# Patient Record
(demographics unavailable — no encounter records)

---

## 2025-07-09 NOTE — HISTORY OF PRESENT ILLNESS
[FreeTextEntry1] : CPE [de-identified] : 30-year-old female with no significant PMHx presenting to the clinic to Establish care and for an Annual Physical Exam. Patient states she felt extremely dizzy on  when she was outside celebrating . Patient states she felt nauseated and felt off, diaphoretic. Patient felt like she was going to pass out, describes as lightheadedness with associated palpitations. She then crouched down and felt better after about 30 min. She was then able to walk without an issue. Denies any LOC. Patient states she had 2 alcoholic drinks (rum and tequila) about an hour before experiencing dizziness. Patient states her HR did go up to 120s at the time. Since HR has not been out of the usual with workouts. She has never had anything like this happen before. Patient states since she has been feeling a little lightheaded after working out (stairmaster, treadmill). Patient states she has feels she did not drink enough water  weekend. Denies any new supplements, meds. Patient states she has been trying to lose weight recently and has modified her diet, cutting back on red meat, trying to eat healthier meals.  Patient states her BP tends to be elevated in the doctor's office; this AM BP was 110/80 at home. She has a BP cuff to monitor mom's BP.   No other complaints at this time.   FHx: mother (living, 55 yo) - T2DM, father (living, 63yo ) - healthy, maternal grandfather ( at 85 yo) - CAD, triple bypass, two sisters (living, 31yo and 26 yo) - both healthy    SOCIAL Hx: Lives with parents, feels safe at home Diet: breakfast - eggs, lunch - yogurt, dinner - veggie laquita, vegetables, occasional red meat  Exercise: stairmaster/treadmill walk on incline 4x days ago; no structured resistance training Tobacco: never a smoker Alcohol: socially, once a month; normally 1-2 drinks in one sitting Recreational Drugs: denies  Caffeine: drinks venti cold brew 5x days a week  Water intake: 8 glasses a day  Sexually active: sexually active with one male only; denies Hx of STIs  Occupation: works for [employer], purchasing products, desk work  Stressors: no significant stressors    HEALTHCARE MAINTENANCE: Last CPE: 2 years ago  Pap Smear: 2024, wnl per patient  LMP: 2025   IMMUNIZATIONS: Tetanus: declines

## 2025-07-09 NOTE — HEALTH RISK ASSESSMENT
[Yes] : Yes [0] : 2) Feeling down, depressed, or hopeless: Not at all (0) [PHQ-2 Negative - No further assessment needed] : PHQ-2 Negative - No further assessment needed [Fully functional (bathing, dressing, toileting, transferring, walking, feeding)] : Fully functional (bathing, dressing, toileting, transferring, walking, feeding) [Fully functional (using the telephone, shopping, preparing meals, housekeeping, doing laundry, using] : Fully functional and needs no help or supervision to perform IADLs (using the telephone, shopping, preparing meals, housekeeping, doing laundry, using transportation, managing medications and managing finances) [Monthly or less (1 pt)] : Monthly or less (1 point) [1 or 2 (0 pts)] : 1 or 2 (0 points) [Never (0 pts)] : Never (0 points) [No] : In the past 12 months have you used drugs other than those required for medical reasons? No [Patient reported PAP Smear was normal] : Patient reported PAP Smear was normal [With Family] : lives with family [Employed] : employed [Sexually Active] : sexually active [Feels Safe at Home] : Feels safe at home [Reports normal functional visual acuity (ie: able to read med bottle)] : Reports normal functional visual acuity [Never] : Never [0-4] : 0-4 [Audit-CScore] : 1 [de-identified] : see HPI [de-identified] : see HPI [[id number]] : 0 [High Risk Behavior] : no high risk behavior [Reports changes in hearing] : Reports no changes in hearing [Reports changes in vision] : Reports no changes in vision [Reports changes in dental health] : Reports no changes in dental health [PapSmearDate] : 07/24

## 2025-07-09 NOTE — ASSESSMENT
[Vaccines Reviewed] : Immunizations reviewed today. Please see immunization details in the vaccine log within the immunization flowsheet.  [FreeTextEntry1] : 30-year-old female with no significant PMHx presenting to the clinic to Establish care and for an Annual Physical Exam.  #Healthcare maintenance: - Routine Labs: CBC, CMP, TSH w/ reflex T4, Lipid panel, A1c - Unsure when she last got Tdap, declines Tdap at this time - Diet: Encouraged a balanced diet with portion control, emphasizing whole foods, lean proteins, fruits, and vegetables while minimizing processed foods. - Encouraged patient continue with regular cardio exercise for at least 150 minutes a week; recommend trying to incorporate strength training exercises at least twice weekly - Up to date on healthcare maintenance  #Dizziness - Likely secondary to dehydration, patient trying to lose weight recently but would rule out cardiac etiology - Associated with palpitations at time patient experienced dizziness on 7/4  - EKG today with SR, vent rate 80 bpm, no ischemic changes noted - Recommend seeing Cardiology - referral provided - Follow up in 3 months   #Elevated BP - /94 (LUE) - Repeat /86 (LUE) and 134/86 (RUE) - Recommend keeping BP log, taking BP twice a week - return to office if BP > 130/90 - Precautions provided for ED if patient symptomatic or BP significantly elevated  - Follow up as needed

## 2025-07-09 NOTE — PHYSICAL EXAM
[TextEntry] : GENERAL: patient appears well-developed, well-groomed, well-nourished; no acute distress; appropriately interactive HEAD: normocephalic, atraumatic EYES: sclera clear and anicteric, no exudates, PERRLA, EOMI ENMT: oropharynx clear without erythema, no exudates, moist mucous membranes; tympanic membrane non-bulging, non-erythematous, canal clear NECK: supple, soft, no lymphadenopathy, thyroid non-enlarged   LUNGS: good air entry bilaterally, clear to auscultation, symmetric breath sounds, no wheezing, crackles, or rhonchi appreciated HEART: S1/S2 present, regular rate and rhythm, no murmurs noted, no lower extremity edema, 2+ distal pulses in upper and lower extremities bilaterally GASTROINTESTINAL: abdomen is soft, non-tender, non-distended, normoactive bowel sounds INTEGUMENT: good skin turgor, warm skin, appears well perfused, no lesions, no rashes MUSCULOSKELETAL: no clubbing or cyanosis, no obvious deformity NEUROLOGIC: awake, alert, oriented x3, good muscle tone in all 4 extremities, CN II-XII intact, 5/5 motor strength in upper and lower extremities bilaterally, 5/5  strength, sensation intact in upper and lower extremities bilaterally PSYCHIATRIC: affect appropriate, mood congruent, thought process logical and linear HEME/LYMPH: no obvious ecchymosis or petechiae

## 2025-07-09 NOTE — REVIEW OF SYSTEMS
[TextEntry] : CONSTITUTIONAL: denies fever, chills, fatigue, weakness, significant weight loss/gain HEENT: denies blurred vision, vision changes, hearing loss, ear pain, sore throat CARDIOVASCULAR: denies chest pain, chest pressure; + palpitations RESPIRATORY: denies shortness of breath, cough, sputum production GASTROINTESTINAL: denies nausea, vomiting, diarrhea, constipation, abdominal pain, melena or hematochezia GENITOURINARY: denies dysuria, hematuria, discharge NEUROLOGICAL: + occasional lightheadedness; denies numbness, tingling, headache, focal weakness, tremor INTEGUMENTARY: denies new lesions, rashes MUSCULOSKELETAL: denies new joint pain, muscle aches HEMATOLOGIC: denies gross bleeding, bruising

## 2025-07-18 NOTE — REVIEW OF SYSTEMS
[Seasonal Allergies] : seasonal allergies [Lightheadedness] : lightheadedness [Negative] : Ear [de-identified] : Headaches

## 2025-07-18 NOTE — HISTORY OF PRESENT ILLNESS
[de-identified] : Oralia Christensen is a 31 yo female who presents for evaluation of headache. She had two episodes of tinnitus lasting for minutes that occurred last week in conjunction with headache. The tinnitus has resolved but headache has persisted. She denies otaliga, otorrhea, vertigo, hearing loss, or history of recurrent ear infections. She denies family history of early onset hearing loss or significant noise exposure. She denies head trauma or history of migraines.

## 2025-07-18 NOTE — PHYSICAL EXAM
[de-identified] : Left cerumen impaction, unable to be removed. Right EAC wnl. [de-identified] : Unable to visualize left TM due to cerumen. Right TM intact iwthout effusion. [Midline] : trachea located in midline position [Normal] : no rashes

## 2025-07-18 NOTE — ASSESSMENT
[FreeTextEntry1] : Oralia Christensen presents for evaluation of headache. She had brief tinnitus bilaterally last week in conjunction with headaches that resolved. She denies hearing change. On exam, she has left cerumen impaction, unable to be removed. Discussed based on history, headaches may be migraine related. Will refer to neurology.  - start debrox drops BID to left ear for 1 week. - refer to neurology - f/u in 2 weeks.